# Patient Record
Sex: MALE | Race: WHITE | NOT HISPANIC OR LATINO | Employment: OTHER | URBAN - NONMETROPOLITAN AREA
[De-identification: names, ages, dates, MRNs, and addresses within clinical notes are randomized per-mention and may not be internally consistent; named-entity substitution may affect disease eponyms.]

---

## 2021-11-11 ENCOUNTER — OFFICE VISIT (OUTPATIENT)
Dept: CARDIOLOGY | Facility: CLINIC | Age: 81
End: 2021-11-11

## 2021-11-11 VITALS
RESPIRATION RATE: 11 BRPM | BODY MASS INDEX: 25.91 KG/M2 | WEIGHT: 171 LBS | OXYGEN SATURATION: 96 % | DIASTOLIC BLOOD PRESSURE: 74 MMHG | HEIGHT: 68 IN | HEART RATE: 88 BPM | SYSTOLIC BLOOD PRESSURE: 136 MMHG | TEMPERATURE: 97 F

## 2021-11-11 DIAGNOSIS — I10 ESSENTIAL HYPERTENSION: ICD-10-CM

## 2021-11-11 DIAGNOSIS — R00.2 PALPITATIONS: ICD-10-CM

## 2021-11-11 DIAGNOSIS — E78.5 HYPERLIPIDEMIA LDL GOAL <70: ICD-10-CM

## 2021-11-11 DIAGNOSIS — R06.02 SHORTNESS OF BREATH: Primary | ICD-10-CM

## 2021-11-11 PROCEDURE — 93000 ELECTROCARDIOGRAM COMPLETE: CPT | Performed by: INTERNAL MEDICINE

## 2021-11-11 PROCEDURE — 99204 OFFICE O/P NEW MOD 45 MIN: CPT | Performed by: INTERNAL MEDICINE

## 2021-11-11 RX ORDER — LISINOPRIL 10 MG/1
10 TABLET ORAL DAILY
Qty: 90 TABLET | Refills: 1 | Status: SHIPPED | OUTPATIENT
Start: 2021-11-11 | End: 2022-07-26

## 2021-11-11 RX ORDER — ROSUVASTATIN CALCIUM 5 MG/1
5 TABLET, COATED ORAL DAILY
Qty: 90 TABLET | Refills: 3 | Status: SHIPPED | OUTPATIENT
Start: 2021-11-11

## 2021-11-11 NOTE — PROGRESS NOTES
[facility] [place]  [facility] CARDIOLOGY  [address] [place] [address]  Dept: [phone]  Dept Fax: [phone]    Benjy Moore  1940    New Patient Office Note    History of Present Illness:  Benjy Moore is a 81 y.o. male who presents to the clinic for New Patient. He is 81 years old with diabetes, Hypertension, , has been having some days of fluttering heart and feels weak, his wife check the pulse and seems irregular, he denies any chest pain, has some SOB possible related to black lungs, his EKG  Sinus with LASHB, Hr is 62, we do not have prior EKG, his cardiac exam seems normal BP is 142.70, will increase Lisinopril to 10 mg,    The following portions of the patient's history were reviewed and updated as appropriate: allergies, current medications, past family history, past medical history, past social history, past surgical history and problem list.    Medications:  acetaminophen-codeine  albuterol  aspirin  lisinopril  loratadine  metFORMIN  multivitamin tablet  nateglinide  nitroglycerin  Omega-3 capsule delayed-release  ranolazine  tamsulosin capsule    Subjective  Allergies   Allergen Reactions   • Sulfa Antibiotics Hives        Past Medical History:   Diagnosis Date   • BPH (benign prostatic hyperplasia)    • CAD (coronary artery disease)    • Chronic back pain    • Chronic sinusitis    • COPD (chronic obstructive pulmonary disease) (HCC)    • DJD (degenerative joint disease)    • Family history of migraine headaches    • GERD (gastroesophageal reflux disease)    • Hyperlipidemia    • Hypertension    • 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)    • Prostate cancer (HCC)        Past Surgical History:   Procedure Laterality Date   • CATARACT EXTRACTION, BILATERAL     • CHOLECYSTECTOMY     • CYSTOSCOPY TRANSURETHRAL RESECTION OF PROSTATE     • INGUINAL HERNIA REPAIR Right        Family  "History   Family history unknown: Yes        Social History     Socioeconomic History   • Marital status:    Tobacco Use   • Smoking status: Former Smoker     Packs/day: 1.00     Types: Cigarettes     Start date:      Quit date:      Years since quittin.8   • Smokeless tobacco: Never Used   Vaping Use   • Vaping Use: Never used   Substance and Sexual Activity   • Alcohol use: Never   • Drug use: Never   • Sexual activity: Never       Review of Systems   Constitutional: Negative.    HENT: Negative.    Respiratory: Positive for shortness of breath.    Cardiovascular: Positive for palpitations.   Endocrine: Negative.    Genitourinary: Negative.    Musculoskeletal: Negative.    Skin: Negative.    Allergic/Immunologic: Negative.    Neurological: Negative.    Hematological: Negative.    Psychiatric/Behavioral: Negative.    All other systems reviewed and are negative.      Cardiovascular Procedures    ECHO/MUGA:   STRESS TESTS:   CARDIAC CATH:   DEVICES:   HOLTER:   CT/MRI:   VASCULAR:   CARDIOTHORACIC:     Objective  Vitals:    21 1117   BP: 136/74   BP Location: Left arm   Patient Position: Sitting   Cuff Size: Adult   Pulse: 88   Resp: 11   Temp: 97 °F (36.1 °C)   TempSrc: Infrared   SpO2: 96%   Weight: 77.6 kg (171 lb)   Height: 172.7 cm (68\")   PainSc: 0-No pain       Physical Exam  Constitutional:       Appearance: Healthy appearance. Not in distress.   Neck:      Vascular: No JVR. JVD normal.   Pulmonary:      Effort: Pulmonary effort is normal.      Breath sounds: Normal breath sounds. No wheezing. No rhonchi. No rales.   Chest:      Chest wall: Not tender to palpatation.   Cardiovascular:      PMI at left midclavicular line. Normal rate. Regular rhythm. Normal S1. Normal S2.      Murmurs: There is no murmur.      No gallop. No click. No rub.   Pulses:     Intact distal pulses.   Edema:     Peripheral edema absent.   Abdominal:      General: Bowel sounds are normal.      Palpations: " Abdomen is soft.      Tenderness: There is no abdominal tenderness.   Musculoskeletal: Normal range of motion.         General: No tenderness. Skin:     General: Skin is warm and dry.   Neurological:      General: No focal deficit present.      Mental Status: Alert and oriented to person, place and time.          Diagnostic Data    ECG 12 Lead    Date/Time: 11/11/2021 12:18 PM  Performed by: Baljinder Lopez MD  Authorized by: Baljinder Lopez MD   Comparison: not compared with previous ECG   Rhythm: sinus rhythm  Rate: normal  BPM: 62  Conduction: left anterior fascicular block  QRS axis: left    Clinical impression: normal ECG            Assessment and Plan  Diagnoses and all orders for this visit:    Shortness of breath- Will get an echo, he has had a cardiac cath in 2010 with normal findings, has black lungs  -     Adult Transthoracic Echo Complete W/ Cont if Necessary Per Protocol; Future    Essential hypertension- BP is 142.,70, will increase Lisinopril to 10 mg    Palpitations- Will get a  Holter 48 hours  -     Holter Monitor - 48 Hour; Future  -     Adult Transthoracic Echo Complete W/ Cont if Necessary Per Protocol; Future  Hyperlipidemia - LDl is 83, will use Crestor 5mg    Other orders  -     lisinopril (PRINIVIL,ZESTRIL) 10 MG tablet; Take 1 tablet by mouth Daily.        Return in about 2 weeks (around 11/25/2021) for Recheck.    Baljinder Lopez MD  11/11/2021

## 2021-11-19 ENCOUNTER — PATIENT ROUNDING (BHMG ONLY) (OUTPATIENT)
Dept: CARDIOLOGY | Facility: CLINIC | Age: 81
End: 2021-11-19

## 2021-11-19 NOTE — PROGRESS NOTES
November 19, 2021    Hello, may I speak with Benjy Moore?    My name is dave     Unable to reach patient

## 2021-11-30 ENCOUNTER — OFFICE VISIT (OUTPATIENT)
Dept: CARDIOLOGY | Facility: CLINIC | Age: 81
End: 2021-11-30

## 2021-11-30 VITALS
HEART RATE: 65 BPM | RESPIRATION RATE: 11 BRPM | TEMPERATURE: 97 F | WEIGHT: 169 LBS | DIASTOLIC BLOOD PRESSURE: 78 MMHG | BODY MASS INDEX: 25.61 KG/M2 | HEIGHT: 68 IN | OXYGEN SATURATION: 96 % | SYSTOLIC BLOOD PRESSURE: 126 MMHG

## 2021-11-30 DIAGNOSIS — R00.2 PALPITATIONS: ICD-10-CM

## 2021-11-30 DIAGNOSIS — R06.02 SHORTNESS OF BREATH: Primary | ICD-10-CM

## 2021-11-30 DIAGNOSIS — I10 ESSENTIAL HYPERTENSION: ICD-10-CM

## 2021-11-30 DIAGNOSIS — E78.5 HYPERLIPIDEMIA LDL GOAL <70: ICD-10-CM

## 2021-11-30 PROCEDURE — 99214 OFFICE O/P EST MOD 30 MIN: CPT | Performed by: INTERNAL MEDICINE

## 2021-11-30 NOTE — PROGRESS NOTES
[facility] [place]  [facility] CARDIOLOGY  [address] [place] [address]  Dept: [phone]  Dept Fax: [phone]    Benjy Moore  1940    Follow Up Office Visit Note    History of Present Illness:  Benjy Moore is a 81 y.o. male who presents to the clinic for Follow-up. Shortness of breath- He still have SOB, echo normal EF mild diastolic dysfunction, , no signficant valvular disease as he has diabetes for over 20 years will look for CAD. Will get a Lexiscan Nuclear     The following portions of the patient's history were reviewed and updated as appropriate: allergies, current medications, past family history, past medical history, past social history, past surgical history and problem list.    Medications:  acetaminophen-codeine  albuterol  aspirin  lisinopril  loratadine  metFORMIN  multivitamin tablet  nateglinide  nitroglycerin  Omega-3 capsule delayed-release  ranolazine  rosuvastatin  tamsulosin capsule    Subjective  Allergies   Allergen Reactions   • Sulfa Antibiotics Hives        Past Medical History:   Diagnosis Date   • BPH (benign prostatic hyperplasia)    • CAD (coronary artery disease)    • Chronic back pain    • Chronic sinusitis    • COPD (chronic obstructive pulmonary disease) (HCC)    • DJD (degenerative joint disease)    • Family history of migraine headaches    • GERD (gastroesophageal reflux disease)    • Hyperlipidemia    • Hypertension    • 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)    • Prostate cancer (HCC)        Past Surgical History:   Procedure Laterality Date   • CATARACT EXTRACTION, BILATERAL     • CHOLECYSTECTOMY     • CYSTOSCOPY TRANSURETHRAL RESECTION OF PROSTATE     • INGUINAL HERNIA REPAIR Right        Family History   Family history unknown: Yes        Social History     Socioeconomic History   • Marital status:    Tobacco Use   • Smoking status: Former  "Smoker     Packs/day: 1.00     Types: Cigarettes     Start date:      Quit date:      Years since quittin.9   • Smokeless tobacco: Never Used   Vaping Use   • Vaping Use: Never used   Substance and Sexual Activity   • Alcohol use: Never   • Drug use: Never   • Sexual activity: Never       Review of Systems   Constitutional: Negative.    Respiratory: Positive for shortness of breath and stridor.    Cardiovascular: Negative.    Endocrine: Negative.    Genitourinary: Negative.    Musculoskeletal: Negative.    Skin: Negative.    Allergic/Immunologic: Negative.    Neurological: Negative.    Hematological: Negative.    Psychiatric/Behavioral: Negative.    All other systems reviewed and are negative.      Cardiovascular Procedures    ECHO/MUGA:   STRESS TESTS:   CARDIAC CATH:   DEVICES:   HOLTER:   CT/MRI:   VASCULAR:   CARDIOTHORACIC:     Objective  Vitals:    21 1044   BP: 126/78   BP Location: Left arm   Patient Position: Sitting   Cuff Size: Adult   Pulse: 65   Resp: 11   Temp: 97 °F (36.1 °C)   TempSrc: Infrared   SpO2: 96%   Weight: 76.7 kg (169 lb)   Height: 172.7 cm (68\")   PainSc: 0-No pain     Body mass index is 25.7 kg/m².     Physical Exam  Constitutional:       Appearance: Healthy appearance. Not in distress.   Neck:      Vascular: No JVR. JVD normal.   Pulmonary:      Effort: Pulmonary effort is normal.      Breath sounds: Normal breath sounds. No wheezing. No rhonchi. No rales.   Chest:      Chest wall: Not tender to palpatation.   Cardiovascular:      PMI at left midclavicular line. Normal rate. Regular rhythm. Normal S1. Normal S2.      Murmurs: There is no murmur.      No gallop. No click. No rub.   Pulses:     Intact distal pulses.   Edema:     Peripheral edema absent.   Abdominal:      General: Bowel sounds are normal.      Palpations: Abdomen is soft.      Tenderness: There is no abdominal tenderness.   Musculoskeletal: Normal range of motion.         General: No tenderness. Skin:    "  General: Skin is warm and dry.   Neurological:      General: No focal deficit present.      Mental Status: Alert and oriented to person, place and time.          Diagnostic Data  Procedures    Assessment and Plan  Diagnoses and all orders for this visit:    Shortness of breath- Will need to look for ischemia, will get a Lexiscan nuclear, echo was according to his gae, normal EF  -     Stress Test With Myocardial Perfusion One Day; Future    Essential hypertension- BP is 130.80 on Lisinopril 5 mg daily     Palpitations- Holter 48 hours was normal, will get a Event Monitor Zio for 3 weeks   -     Holter Monitor - 72 Hour Up To 15 Days; Future    Hyperlipidemia LDL goal <70- On Crestor 5 mg . Just started tolerating well .         Return in about 1 month (around 12/30/2021) for Recheck.    Baljinder Lopez MD  11/30/2021

## 2021-12-01 ENCOUNTER — TELEPHONE (OUTPATIENT)
Dept: CARDIOLOGY | Facility: CLINIC | Age: 81
End: 2021-12-01

## 2022-01-04 ENCOUNTER — OFFICE VISIT (OUTPATIENT)
Dept: CARDIOLOGY | Facility: CLINIC | Age: 82
End: 2022-01-04

## 2022-01-04 VITALS
BODY MASS INDEX: 25.61 KG/M2 | RESPIRATION RATE: 11 BRPM | TEMPERATURE: 97 F | WEIGHT: 169 LBS | SYSTOLIC BLOOD PRESSURE: 120 MMHG | HEIGHT: 68 IN | HEART RATE: 88 BPM | DIASTOLIC BLOOD PRESSURE: 60 MMHG | OXYGEN SATURATION: 99 %

## 2022-01-04 DIAGNOSIS — R06.02 SHORTNESS OF BREATH: Primary | ICD-10-CM

## 2022-01-04 DIAGNOSIS — R00.2 PALPITATIONS: ICD-10-CM

## 2022-01-04 DIAGNOSIS — E78.5 HYPERLIPIDEMIA LDL GOAL <70: ICD-10-CM

## 2022-01-04 DIAGNOSIS — I10 ESSENTIAL HYPERTENSION: ICD-10-CM

## 2022-01-04 PROCEDURE — 93000 ELECTROCARDIOGRAM COMPLETE: CPT | Performed by: INTERNAL MEDICINE

## 2022-01-04 PROCEDURE — 99214 OFFICE O/P EST MOD 30 MIN: CPT | Performed by: INTERNAL MEDICINE

## 2022-01-04 NOTE — PROGRESS NOTES
[facility] [place]  [facility] CARDIOLOGY  [address] [place] [address]  Dept: [phone]  Dept Fax: [phone]    Benjy Moore  1940    Follow Up Office Visit Note    History of Present Illness:  Benjy Moore is a 81 y.o. male who presents to the clinic for SOB- He underwent stress nuclear normal,, Ef normal will observe, he has diabetes for over 20 years     The following portions of the patient's history were reviewed and updated as appropriate: allergies, current medications, past family history, past medical history, past social history, past surgical history and problem list.    Medications:  acetaminophen-codeine  albuterol  aspirin  lisinopril  loratadine  metFORMIN  multivitamin tablet  nateglinide  nitroglycerin  Omega-3 capsule delayed-release  ranolazine  rosuvastatin  tamsulosin capsule    Subjective  Allergies   Allergen Reactions   • Sulfa Antibiotics Hives        Past Medical History:   Diagnosis Date   • BPH (benign prostatic hyperplasia)    • CAD (coronary artery disease)    • Chronic back pain    • Chronic sinusitis    • COPD (chronic obstructive pulmonary disease) (HCC)    • DJD (degenerative joint disease)    • Family history of migraine headaches    • GERD (gastroesophageal reflux disease)    • Hyperlipidemia    • Hypertension    • 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)    • Prostate cancer (HCC)        Past Surgical History:   Procedure Laterality Date   • CATARACT EXTRACTION, BILATERAL     • CHOLECYSTECTOMY     • CYSTOSCOPY TRANSURETHRAL RESECTION OF PROSTATE     • INGUINAL HERNIA REPAIR Right        Family History   Family history unknown: Yes        Social History     Socioeconomic History   • Marital status:    Tobacco Use   • Smoking status: Former Smoker     Packs/day: 1.00     Types: Cigarettes     Start date: 1959     Quit date: 1990     Years since quitting:  "32.0   • Smokeless tobacco: Never Used   Vaping Use   • Vaping Use: Never used   Substance and Sexual Activity   • Alcohol use: Never   • Drug use: Never   • Sexual activity: Never       Review of Systems   Constitutional: Negative.    HENT: Negative.    Respiratory: Positive for shortness of breath.    Cardiovascular: Negative.    Endocrine: Negative.    Genitourinary: Negative.    Musculoskeletal: Negative.    Skin: Negative.    Allergic/Immunologic: Negative.    Neurological: Negative.    Hematological: Negative.    Psychiatric/Behavioral: Negative.    All other systems reviewed and are negative.      Cardiovascular Procedures    ECHO/MUGA:   STRESS TESTS:   CARDIAC CATH:   DEVICES:   HOLTER:   CT/MRI:   VASCULAR:   CARDIOTHORACIC:     Objective  Vitals:    01/04/22 0946   BP: 120/60   BP Location: Left arm   Patient Position: Sitting   Cuff Size: Adult   Pulse: 88   Resp: 11   Temp: 97 °F (36.1 °C)   TempSrc: Infrared   SpO2: 99%   Weight: 76.7 kg (169 lb)   Height: 172.7 cm (68\")   PainSc: 0-No pain     Body mass index is 25.7 kg/m².     Physical Exam  Constitutional:       Appearance: Healthy appearance. Not in distress.   Neck:      Vascular: No JVR. JVD normal.   Pulmonary:      Effort: Pulmonary effort is normal.      Breath sounds: Normal breath sounds. No wheezing. No rhonchi. No rales.   Chest:      Chest wall: Not tender to palpatation.   Cardiovascular:      PMI at left midclavicular line. Normal rate. Regular rhythm. Normal S1. Normal S2.      Murmurs: There is no murmur.      No gallop. No click. No rub.   Pulses:     Intact distal pulses.   Edema:     Peripheral edema absent.   Abdominal:      General: Bowel sounds are normal.      Palpations: Abdomen is soft.      Tenderness: There is no abdominal tenderness.   Musculoskeletal: Normal range of motion.         General: No tenderness. Skin:     General: Skin is warm and dry.   Neurological:      General: No focal deficit present.      Mental Status: " Alert and oriented to person, place and time.          Diagnostic Data    ECG 12 Lead    Date/Time: 1/4/2022 10:10 AM  Performed by: Baljinder Lopez MD  Authorized by: Baljinder Lopez MD   Comparison: compared with previous ECG from 11/11/2021  Similar to previous ECG  Rhythm: sinus rhythm  Rate: normal  BPM: 76  Conduction: left anterior fascicular block  QRS axis: left    Clinical impression: abnormal EKG            Assessment and Plan  Diagnoses and all orders for this visit:    Shortness of breath-Stress and echo fine, will observe , no chest pain    Essential hypertension- BP is good 125.80, will keep Lisinopril 5 mg    Palpitations- Holter just rare PVC`S and PAC`S     Hyperlipidemia LDL goal <70- On Crestor 5 mg, tolerating well, will need lipid next visit         Return in about 6 months (around 7/4/2022) for Recheck.    Baljinder Lopez MD  01/04/2022

## 2022-07-26 RX ORDER — LISINOPRIL 10 MG/1
TABLET ORAL
Qty: 90 TABLET | Refills: 0 | Status: SHIPPED | OUTPATIENT
Start: 2022-07-26 | End: 2022-08-24

## 2022-08-24 RX ORDER — LISINOPRIL 10 MG/1
TABLET ORAL
Qty: 90 TABLET | Refills: 0 | Status: SHIPPED | OUTPATIENT
Start: 2022-08-24